# Patient Record
Sex: MALE
[De-identification: names, ages, dates, MRNs, and addresses within clinical notes are randomized per-mention and may not be internally consistent; named-entity substitution may affect disease eponyms.]

---

## 2023-09-21 ENCOUNTER — APPOINTMENT (OUTPATIENT)
Dept: UROLOGY | Facility: CLINIC | Age: 53
End: 2023-09-21
Payer: MEDICAID

## 2023-09-21 ENCOUNTER — LABORATORY RESULT (OUTPATIENT)
Age: 53
End: 2023-09-21

## 2023-09-21 VITALS
HEIGHT: 72 IN | OXYGEN SATURATION: 97 % | DIASTOLIC BLOOD PRESSURE: 129 MMHG | HEART RATE: 82 BPM | WEIGHT: 227 LBS | SYSTOLIC BLOOD PRESSURE: 174 MMHG | RESPIRATION RATE: 16 BRPM | BODY MASS INDEX: 30.75 KG/M2

## 2023-09-21 DIAGNOSIS — Z87.891 PERSONAL HISTORY OF NICOTINE DEPENDENCE: ICD-10-CM

## 2023-09-21 DIAGNOSIS — R35.0 FREQUENCY OF MICTURITION: ICD-10-CM

## 2023-09-21 DIAGNOSIS — Z63.5 DISRUPTION OF FAMILY BY SEPARATION AND DIVORCE: ICD-10-CM

## 2023-09-21 DIAGNOSIS — R39.12 POOR URINARY STREAM: ICD-10-CM

## 2023-09-21 DIAGNOSIS — I10 ESSENTIAL (PRIMARY) HYPERTENSION: ICD-10-CM

## 2023-09-21 DIAGNOSIS — R35.1 NOCTURIA: ICD-10-CM

## 2023-09-21 DIAGNOSIS — Z78.9 OTHER SPECIFIED HEALTH STATUS: ICD-10-CM

## 2023-09-21 DIAGNOSIS — R30.0 DYSURIA: ICD-10-CM

## 2023-09-21 DIAGNOSIS — F17.200 NICOTINE DEPENDENCE, UNSPECIFIED, UNCOMPLICATED: ICD-10-CM

## 2023-09-21 PROBLEM — Z00.00 ENCOUNTER FOR PREVENTIVE HEALTH EXAMINATION: Status: ACTIVE | Noted: 2023-09-21

## 2023-09-21 PROCEDURE — 99204 OFFICE O/P NEW MOD 45 MIN: CPT | Mod: 25

## 2023-09-21 PROCEDURE — 76857 US EXAM PELVIC LIMITED: CPT

## 2023-09-21 RX ORDER — TAMSULOSIN HYDROCHLORIDE 0.4 MG/1
0.4 CAPSULE ORAL
Qty: 90 | Refills: 3 | Status: ACTIVE | COMMUNITY
Start: 2023-09-21 | End: 1900-01-01

## 2023-09-21 RX ORDER — SILDENAFIL 100 MG/1
100 TABLET, FILM COATED ORAL
Qty: 60 | Refills: 3 | Status: ACTIVE | COMMUNITY
Start: 2023-09-21 | End: 1900-01-01

## 2023-09-21 SDOH — SOCIAL STABILITY - SOCIAL INSECURITY: DISRUPTION OF FAMILY BY SEPARATION AND DIVORCE: Z63.5

## 2023-09-24 ENCOUNTER — NON-APPOINTMENT (OUTPATIENT)
Age: 53
End: 2023-09-24

## 2023-09-25 LAB
APPEARANCE: CLEAR
BACTERIA UR CULT: NORMAL
BILIRUBIN URINE: NEGATIVE
BLOOD URINE: NEGATIVE
COLOR: NORMAL
GLUCOSE QUALITATIVE U: NEGATIVE MG/DL
KETONES URINE: ABNORMAL MG/DL
LEUKOCYTE ESTERASE URINE: NEGATIVE
NITRITE URINE: NEGATIVE
PH URINE: 5.5
PROTEIN URINE: 300 MG/DL
SPECIFIC GRAVITY URINE: 1.02
UROBILINOGEN URINE: 1 MG/DL

## 2023-10-23 ENCOUNTER — APPOINTMENT (OUTPATIENT)
Dept: UROLOGY | Facility: CLINIC | Age: 53
End: 2023-10-23
Payer: MEDICAID

## 2023-10-23 ENCOUNTER — LABORATORY RESULT (OUTPATIENT)
Age: 53
End: 2023-10-23

## 2023-10-23 VITALS
DIASTOLIC BLOOD PRESSURE: 123 MMHG | OXYGEN SATURATION: 98 % | HEIGHT: 72 IN | BODY MASS INDEX: 30.75 KG/M2 | SYSTOLIC BLOOD PRESSURE: 174 MMHG | WEIGHT: 227 LBS | RESPIRATION RATE: 16 BRPM | HEART RATE: 85 BPM

## 2023-10-23 DIAGNOSIS — N52.01 ERECTILE DYSFUNCTION DUE TO ARTERIAL INSUFFICIENCY: ICD-10-CM

## 2023-10-23 PROCEDURE — 99214 OFFICE O/P EST MOD 30 MIN: CPT

## 2023-10-23 RX ORDER — FINASTERIDE 5 MG/1
5 TABLET, FILM COATED ORAL DAILY
Qty: 90 | Refills: 3 | Status: ACTIVE | COMMUNITY
Start: 2023-10-23 | End: 1900-01-01

## 2023-10-24 ENCOUNTER — NON-APPOINTMENT (OUTPATIENT)
Age: 53
End: 2023-10-24

## 2023-10-24 LAB
APPEARANCE: CLEAR
BILIRUBIN URINE: ABNORMAL
BLOOD URINE: NEGATIVE
COLOR: NORMAL
GLUCOSE QUALITATIVE U: NEGATIVE MG/DL
KETONES URINE: ABNORMAL MG/DL
LEUKOCYTE ESTERASE URINE: ABNORMAL
NITRITE URINE: NEGATIVE
PH URINE: 5.5
PROTEIN URINE: 300 MG/DL
SPECIFIC GRAVITY URINE: 1.03
UROBILINOGEN URINE: 1 MG/DL

## 2023-10-27 LAB
BACTERIA UR CULT: NORMAL
PSA FREE FLD-MCNC: 14 %
PSA FREE SERPL-MCNC: 0.91 NG/ML
PSA SERPL-MCNC: 6.7 NG/ML

## 2023-10-27 RX ORDER — AMOXICILLIN AND CLAVULANATE POTASSIUM 875; 125 MG/1; MG/1
875-125 TABLET, COATED ORAL TWICE DAILY
Qty: 14 | Refills: 0 | Status: ACTIVE | COMMUNITY
Start: 2023-10-27 | End: 1900-01-01

## 2023-11-06 ENCOUNTER — TRANSCRIPTION ENCOUNTER (OUTPATIENT)
Age: 53
End: 2023-11-06

## 2023-11-27 ENCOUNTER — APPOINTMENT (OUTPATIENT)
Dept: UROLOGY | Facility: CLINIC | Age: 53
End: 2023-11-27

## 2023-11-30 ENCOUNTER — NON-APPOINTMENT (OUTPATIENT)
Age: 53
End: 2023-11-30

## 2024-02-05 ENCOUNTER — APPOINTMENT (OUTPATIENT)
Dept: UROLOGY | Facility: CLINIC | Age: 54
End: 2024-02-05
Payer: MEDICAID

## 2024-02-05 VITALS
OXYGEN SATURATION: 99 % | SYSTOLIC BLOOD PRESSURE: 158 MMHG | HEART RATE: 85 BPM | DIASTOLIC BLOOD PRESSURE: 104 MMHG | RESPIRATION RATE: 16 BRPM | WEIGHT: 225 LBS | BODY MASS INDEX: 30.48 KG/M2 | HEIGHT: 72 IN

## 2024-02-05 DIAGNOSIS — N39.0 URINARY TRACT INFECTION, SITE NOT SPECIFIED: ICD-10-CM

## 2024-02-05 DIAGNOSIS — Z12.5 ENCOUNTER FOR SCREENING FOR MALIGNANT NEOPLASM OF PROSTATE: ICD-10-CM

## 2024-02-05 PROCEDURE — G2211 COMPLEX E/M VISIT ADD ON: CPT | Mod: NC,1L

## 2024-02-05 PROCEDURE — 76857 US EXAM PELVIC LIMITED: CPT

## 2024-02-05 PROCEDURE — 99214 OFFICE O/P EST MOD 30 MIN: CPT | Mod: 25

## 2024-02-05 NOTE — HISTORY OF PRESENT ILLNESS
[FreeTextEntry1] :    Recent stroke slow urination, dysuria, and erectile dysfunction saw [facility] urologist in feb 2023 -- he was given multiple medications I rx tamsulosin and finasteride - intermittent reuslts nocturia x 3 now  Feb 2024 -- U S- prostate 67g with pvr 18ml   Sept 2023 IPSS - 22 - severe - nocturia x4 -- terrible qol  dec 2023  left upper pole renal mass pephrometry score - 8ph -- 3.3cm   For ED -- takes daily cialis -- was giving poor response with ED but did not improve urination - I rx sildenafil- 100mg which no longer having good effects  was told that he has a growth in his kidney -- due for an MRI next next month Nov 2023  as far as prostate cancer screening -- he has been told that it is normal -- he will send us those results when they are available.  oct 2023 PS A-  6.7  sept 2023 Culture - Urine; no growth Urinalysis; neg blood, neg LE and nit UA - prostate 69g with pvr of 15ml

## 2024-02-05 NOTE — PLAN
[TextEntry] : pt was treated w abx last time for bacteria in urine with high psa repeat those labs today if these are improved would consider procedure for prostate patient has appt with Dr Ness at [facility] for renal tumor that has enlarged  PDE5I are no longer working

## 2024-02-12 ENCOUNTER — APPOINTMENT (OUTPATIENT)
Dept: UROLOGY | Facility: CLINIC | Age: 54
End: 2024-02-12
Payer: MEDICAID

## 2024-02-12 VITALS — TEMPERATURE: 97 F | SYSTOLIC BLOOD PRESSURE: 151 MMHG | HEART RATE: 77 BPM | DIASTOLIC BLOOD PRESSURE: 92 MMHG

## 2024-02-12 DIAGNOSIS — R97.20 ELEVATED PROSTATE, SPECIFIC ANTIGEN [PSA]: ICD-10-CM

## 2024-02-12 DIAGNOSIS — N40.1 BENIGN PROSTATIC HYPERPLASIA WITH LOWER URINARY TRACT SYMPMS: ICD-10-CM

## 2024-02-12 DIAGNOSIS — N13.8 BENIGN PROSTATIC HYPERPLASIA WITH LOWER URINARY TRACT SYMPMS: ICD-10-CM

## 2024-02-12 LAB
BACTERIA UR CULT: NORMAL
PSA FREE FLD-MCNC: 13 %
PSA FREE SERPL-MCNC: 0.53 NG/ML
PSA SERPL-MCNC: 4.05 NG/ML

## 2024-02-12 PROCEDURE — G2211 COMPLEX E/M VISIT ADD ON: CPT | Mod: NC,1L

## 2024-02-12 PROCEDURE — 99214 OFFICE O/P EST MOD 30 MIN: CPT

## 2024-02-12 NOTE — HISTORY OF PRESENT ILLNESS
[FreeTextEntry1] : Recent stroke slow urination, dysuria, and erectile dysfunction saw [facility] urologist in feb 2023 -- he was given multiple medications I rx tamsulosin and finasteride - intermittent reuslts nocturia x 3 now Feb 2024 -- U S- prostate 67g with pvr 18ml  Sept 2023 IPSS - 22 - severe - nocturia x4 -- terrible qol  dec 2023 left upper pole renal mass pephrometry score - 8ph -- 3.3cm  For ED -- takes daily cialis -- was giving poor response with ED but did not improve urination - I rx sildenafil- 100mg which no longer having good effects  was told that he has a growth in his kidney -- due for an MRI next next month Nov 2023  as far as prostate cancer screening -- he has been told that it is normal -- he will send us those results when they are available.  oct 2023 PS A- 6.7  sept 2023 Culture - Urine; no growth Urinalysis; neg blood, neg LE and nit US- prostate 69g with pvr of 15ml  feb 2024 PSA Profile - Total - 4.05 Culture - Urine; neg

## 2024-02-12 NOTE — ASSESSMENT
[FreeTextEntry1] : psa is improved however still high for his age will order an MRI and have patient return for the results with Dr Doss so that in case there is a need for biopsy, there is the need for one fewer appt.

## 2024-02-13 LAB
CREAT SERPL-MCNC: 1.57 MG/DL
EGFR: 53 ML/MIN/1.73M2

## 2024-02-19 ENCOUNTER — RESULT REVIEW (OUTPATIENT)
Age: 54
End: 2024-02-19

## 2024-02-19 ENCOUNTER — APPOINTMENT (OUTPATIENT)
Dept: MRI IMAGING | Facility: CLINIC | Age: 54
End: 2024-02-19
Payer: MEDICAID

## 2024-02-19 ENCOUNTER — OUTPATIENT (OUTPATIENT)
Dept: OUTPATIENT SERVICES | Facility: HOSPITAL | Age: 54
LOS: 1 days | End: 2024-02-19

## 2024-02-19 PROCEDURE — 76498P: CUSTOM | Mod: 26

## 2024-02-19 PROCEDURE — 72197 MRI PELVIS W/O & W/DYE: CPT | Mod: 26

## 2024-03-12 ENCOUNTER — APPOINTMENT (OUTPATIENT)
Dept: UROLOGY | Facility: CLINIC | Age: 54
End: 2024-03-12
Payer: MEDICAID

## 2024-03-12 VITALS
WEIGHT: 225 LBS | HEART RATE: 89 BPM | DIASTOLIC BLOOD PRESSURE: 99 MMHG | BODY MASS INDEX: 30.48 KG/M2 | HEIGHT: 72 IN | RESPIRATION RATE: 16 BRPM | OXYGEN SATURATION: 98 % | SYSTOLIC BLOOD PRESSURE: 163 MMHG

## 2024-03-12 PROCEDURE — 51798 US URINE CAPACITY MEASURE: CPT

## 2024-03-12 PROCEDURE — 99205 OFFICE O/P NEW HI 60 MIN: CPT | Mod: 25

## 2024-03-13 LAB
APPEARANCE: CLEAR
BACTERIA: NEGATIVE /HPF
BILIRUBIN URINE: NEGATIVE
BLOOD URINE: NEGATIVE
CAST: 1 /LPF
COLOR: YELLOW
EPITHELIAL CELLS: 1 /HPF
GLUCOSE QUALITATIVE U: NEGATIVE MG/DL
KETONES URINE: ABNORMAL MG/DL
LEUKOCYTE ESTERASE URINE: ABNORMAL
MICROSCOPIC-UA: NORMAL
NITRITE URINE: NEGATIVE
PH URINE: 5.5
PROTEIN URINE: 100 MG/DL
RED BLOOD CELLS URINE: 2 /HPF
SPECIFIC GRAVITY URINE: 1.02
UROBILINOGEN URINE: 0.2 MG/DL
WHITE BLOOD CELLS URINE: 4 /HPF

## 2024-03-13 NOTE — LETTER BODY
[Dear  ___] : Dear  [unfilled], [Courtesy Letter:] : I had the pleasure of seeing your patient, [unfilled], in my office today. [Please see my note below.] : Please see my note below. [Consult Closing:] : Thank you very much for allowing me to participate in the care of this patient.  If you have any questions, please do not hesitate to contact me. [Sincerely,] : Sincerely, [FreeTextEntry3] : Delicia Doss MD Director of Robotic Education [facility]   [email or web address] [phone]

## 2024-03-13 NOTE — ASSESSMENT
[FreeTextEntry1] : 54 year old with multipole comorbiditiers and 3 prior strokes on AC with elevated PSA, LUTS/BPH, renal lesion, and recent episode heamturia.  Hematuria: We discussed possible etiologies of hematuria including benign (nephrolithiasis, BPH, medical renal disease) and malignant (urothelial cell carcinoma or kidneys, bladder, ureter, renal mass). We discussed risk factors and risk stratification of hematuria. Will work-up with CTU, cysto, cytology.  LUTS: primary issue at this time nocturia, pleased with daytime - SANTANA testing - avoidance fluids before bed - will reeval after CPAP testing and CPAP initiation  Elevated PSA: Today we discussed the natural history of prostate cancer, and the heterogeneous biology of this malignancy.  He understands that many men with prostate cancer will die with the disease rather than of it. We discussed the fact that many prostate cancers are slow growing and unlikely to metastasize or cause death, whereas others can be life threatening. We discussed that PSA in and of itself does not diagnose prostate cancer but only assesses risk. Prior PSAs and MRIs reviewed - prior PSA baseline (lower now and patient as patient on Fiansteride but rechecked at 3 month seema so difficult to determine exact correction) and 2 prior MRIs without lesions. PSAD <0.15 even with correction (and was on Finasteride for 3 months at that time - if use pre Finasteride value PSAD -= 0.12). Had conversation re further work-up ow with 1) biopsy 2) select MDX or 3) observation. SDM - in light of stability and reassuring MRI will monitor for now, especially in light of medical comorbidities and while working up concomitant issues. Likely PSA 6 months.  Renal lesion: We reviewed the possible underlying histology of solid enhancing renal masses, with the majority being malignant (~80%) whereas ~20% are benign (e.g. oncocytoma).  We discussed the role/possibility of percutaneous biopsy, with the associated risks, benefits, complications, and accuracy issues (e.g. risk of false negative results). The heterogeneous natural history/biology of renal cell carcinoma was discussed, including the fact that while many renal cancers are indolent, others behave aggressively. Options were reviewed including, not limited to, active surveillance (AS), surgical extirpation and ablation.  The risks of tumor growth and metastasis on active surveillance were reviewed, including the fact that metastatic progression on AS could mean missing the opportunity for cure.  The average growth rate of ~2-3 mm/year and metastasis rates of ~2-3% on AS over 5 year interval for small renal masses <4 cm was discussed. With respect to treatment we reviewed ablation (cryosurgery, radiofrequency ablation), risks of recurrence, opportunities for salvage treatment, and imaging requirements for follow up.  Oncologic outcomes for ablation were reviewed. With respect to surgery we reviewed nephron sparing surgery vs. radical nephrectomy, as well as open vs. minimally invasive surgical (MIS) approaches (e.g. laparoscopy and robotic assisted laparoscopic surgery).  Personal and institutional experience, as well as other published literature was reviewed.  Oncologic outcomes, renal functional outcomes were compared and contrasted between radical vs. NSS and open vs. MIS surgery.  Risks of acute kidney injury, medical/surgical chronic kidney disease (either exacerbation or new-onset), as well as risks of ESRD development were reviewed.   Risks of conversion from MIS to open surgery discussed.  Risks of converting from attempted partial nephrectomy to radical nephrectomy were discussed.  Risks of surgical complications were reviewed, including not limited to: bleeding//life-threatening hemorrhage, vascular/bowel/adjacent visceral organ injury, trocar/access injury, the possibility of recognized vs. unrecognized/delayed-recognition injury, risks of thermal/blunt/sharp/retraction injury, risks of DVT, PE, MI, death, risks of cardiopulmonary/anesthesia related complications, positional injury, infection/collection/abscess, wound complications/dehiscence/seroma/cellulitis, urinoma/fistula, ureteral injury/obstruction, as well as other complications.  Patient's tumor has demonstrated growth. Last imaging was 4 months ago. Obtaining CT for hematuria and will evaluate size and growth. We discussed in context of medical issues - will likely plan for biopsy, considering IR intervention - will reeval after CTU  ED: will discuss with cards if Cialis ok - patient reports has worked for him in the past. The most common adverse reactions (>2%) of Cialis include headache, dyspepsia, back pain, myalgia, nasal congestion, flushing and pain in limb. There is also a risk of prolonged erection, particularly those at increased risk for priapism. He was informed that this medication could potentiate the effects of high blood pressure medications. He should stop the medication and contact medical help if any issues or concerns with treatment. He denies prior history of cardiac disease, chest pain, or use of nitrates.  Understands cannot use nitrates if develops chest pain and must inform EMT/medical team.  Plan: - UA, culture, cytology - CTU for hematuria and reassess renal lesion growth - cystoscopy - monitoring PSA for now - reached out to cardiologist re Cialis - will ensure no contraindication from cardiac perspective given comorbidities - SANTANA eval underway - will reeval LUTS after - continue Flomax and Finasteride for now - fu after iamging for cystoscopy and discussion next steps

## 2024-03-13 NOTE — HISTORY OF PRESENT ILLNESS
[FreeTextEntry1] : Name FLACA MCKEE  MRN [id number]   1970  Contact Number: [phone] ----------------------------------------------------------------------------------------------------------------------------------------- Date of First Visit: 3/12/24 Referring Provider/PCP: Dr. Td Skelton f: [phone], Dr. Kevin Blanton (cardiologist) -----------------------------------------------------------------------------------------------------------------------------------------  CC: elevated PSA, LUTS, renal lesion, recent hematuria  History of Present Illness: FLACA MCKEE is a 54 year old male who presents for elevated PSA and LUTS. Patient of Dr. Hunter. On Flomax and Finasteride since end of October. PSA at that time was 6.7 with questionable positive culture, treated and repeat PSA 2023 4.05 (on 3 months of Finasteride). On prior TRUS prostate 67g with pvr 18ml. Subsequently underwent prostate MRI 24: Size: 5.0 x 3.8 x 5.3 [transverse x AP x CC] cm. Volume: 52.6 cc, Central gland: Moderate BPH. No MRI targetable lesion. 1.2 cm of protrusion of central gland BPH nodule into base of urinary bladder. Peripheral zone: No MRI targetable lesion. Patchy mild T2 hypointensities bilateral peripheral zone without restricted diffusion but with some enhancement post gadolinium which is suspicious for inflammation/prostatitis. MRI Targets: None  Patient then had a stroke in November - has had 3 strokes in the past. On AC.  LUTS: Patient reports has had issues with urination since  . No significant issues with urination in the daytime. No daytime frequency or urgency. Does report nocturia 3-5x - that is most bothersome. Sleep apnea suspected and patient has a sleep study in April. Stream is good overall. Unsure if emptying bladder completely. Continues on Flomax and Fiansteride and feels has been helping urination. Daytime issues are not bothersome - it is primarily a nighttime issue.  Hematuria: patient reports had an episode of hematuria - last week.  A few drops. Left a urine sample, no other changes, and cleared up on its own. Culture was negative.  Had this a few years ago and not worked up. Reports ex-smoker - quit over 20 years ago, smoked a pack a week for a few years at most. No history nephrolithiasis. No history symptomatic UTIs. No occupational exposures.  Elevated PSA: History of elevated PSA - see trend below. Had prior MRI - PIRADS 1. Repeat MRI here - PIRADS 2, 52.6cc, PSAD 0.07 (though has been on Finasteride 3 months).  Renal lesion: Patient had renal lesion seen on CT 2023.  2.6 x 2.4 x 1.9 cm. Plan was for observation, had repeat CT 2023:  Increase size of heterogeneously enhancing left upper pole mass, now measures 3.3 x 3 x 1.8 cm.  ED: previously tried daily Cialis and reports worked well. Dr. Hunter tried Sildenafil and did not feel had effect. Interested in Cialis prn.  IPSS 12 QOL 5 ROSARIO 12 PVR (to ensure adequate emptying): 0  PSA History: 21: 7.3 21: 6.17 3/28/22: 5.29 10/6/22: 7.76 23: 5.88 10/23/23: 6.7 24: 4.05  PMH: afib, CAD, stroke x 3 (, , and 2023)- no residual deficits, HTN PSH: loop recorder Family History: no  malignancies - no prostate or kidney or bladder cancer, no breast or ovarian cancer Social: , 6 children, on disability, ex-smoker (quit over 20 years ago, smoked a pack a week for a few years at most), +alcohol 6 pack daily + whiskey intermittently, rare marijuana Meds: nifedipine, eliquis, entreso, hydralazine, labetalol, spironolactone, thiamine, finasteride, atorvastatin, tamsulosin Allergies: NKDA, eggplant, strawberries, broccoli ROS: no fevers, chills, dysuria ----------------------------------------------------------------------------------------------------------------------------------------- Labs: PSA History: 21: 7.3 21: 6.17 3/28/22: 5.29 10/6/22: 7.76 23: 5.88 10/23/23: 6.7 24: 4.05  Imagin23: MRI RENAL WITH AND WITHOUT IV CONTRAST   FINDINGS: 01. LIVER: Normal morphology.  Hepatic steatosis.  No suspicious lesion. Scattered cysts. 02. SPLEEN: Normal. 03. PANCREAS: Pancreatic cysts measuring up to 0.5 cm (series 3 image 17). No suspicious solid mass, ductal dilation, or peripancreatic abnormality. 04. GALLBLADDER/BILIARY TREE: No biliary duct dilatation.  Normal gallbladder.    05. ADRENALS: Normal. 06. KIDNEYS: Symmetric enhancement. No hydronephrosis. Scattered bilateral simple and proteinaceous/hemorrhagic cysts. Increase size of heterogeneously enhancing left upper pole mass, now measures 3.3 x 3 x 1.8 cm (series 7 image 40, series 3 image 30), previously 2.6 x 2.4 x 1.9 cm when remeasured similarly.   Nephrometry Score: Radius (maximal diameter in cm): < or = 4 cm (1 point) Exophytic/Endophytic properties: <50% (2 points) Nearness of the tumor to the collecting system (mm): < or = 4 mm (3 points) Anterior/Posterior: Posterior (p) Location relative to polar lines: Lesion crosses the polar line (2 points) Tumor touches renal artery or vein yes = h Nephrometry score: 8ph 07. LYMPHADENOPATHY/RETROPERITONEUM: No lymphadenopathy. 08. BOWEL: No bowel obstruction.  09. PERITONEUM/ABDOMINAL WALL:  No ascites.    10. VASCULATURE: Normal caliber abdominal aorta. 11. SKELETAL: No aggressive lesions.  12. LUNG BASES: No nodule or pleural effusion.  IMPRESSION: Increased size of left upper pole renal mass, concerning for renal cell carcinoma. (Nephrometry score: 8ph).  Hepatic steatosis. Pancreatic cysts measuring up to 0.5 cm. No suspicious solid mass, ductal dilation, or peripancreatic abnormality.  According to the  [facility] Consensus Guidelines for Pancreatic Cysts, for patients without risk factors or history of pancreatic disease, consultation with a Gastroenterologist OR repeat MRI with testing for Ca19-9 and Hemoglobin A1C is recommended in 12 months.  Patients with personal history or risk factors for pancreatic disease are recommended to be evaluated by a Multidisciplinary Pancreas Clinic.   MRI 24:  Size: 5.0 x 3.8 x 5.3 [transverse x AP x CC] cm. Volume: 52.6 cc, Central gland: Moderate BPH. No MRI targetable lesion. 1.2 cm of protrusion of central gland BPH nodule into base of urinary bladder. Peripheral zone: No MRI targetable lesion. Patchy mild T2 hypointensities bilateral peripheral zone without restricted diffusion but with some enhancement post gadolinium which is suspicious for inflammation/prostatitis. MRI Targets: None Neurovascular bundle: No evidence of neurovascular bundle invasion. Seminal vesicles: No seminal vesicle invasion. Lymph nodes: No pelvic adenopathy. Bones: No suspicious lesions identified. Urinary bladder: Mildly trabeculated. Mild circumferential bladder urothelial enhancement. Other: Scattered colonic diverticula. Normal appendix.  IMPRESSION:  PIRADS 2 - Low (clinically significant cancer is unlikely to be present) BPH.

## 2024-03-14 ENCOUNTER — NON-APPOINTMENT (OUTPATIENT)
Age: 54
End: 2024-03-14

## 2024-03-14 LAB — BACTERIA UR CULT: NORMAL

## 2024-03-18 LAB — URINE CYTOLOGY: NORMAL

## 2024-03-20 ENCOUNTER — APPOINTMENT (OUTPATIENT)
Dept: CT IMAGING | Facility: CLINIC | Age: 54
End: 2024-03-20
Payer: MEDICAID

## 2024-03-20 ENCOUNTER — OUTPATIENT (OUTPATIENT)
Dept: OUTPATIENT SERVICES | Facility: HOSPITAL | Age: 54
LOS: 1 days | End: 2024-03-20

## 2024-03-20 PROCEDURE — 74178 CT ABD&PLV WO CNTR FLWD CNTR: CPT | Mod: 26

## 2024-04-17 ENCOUNTER — APPOINTMENT (OUTPATIENT)
Dept: UROLOGY | Facility: CLINIC | Age: 54
End: 2024-04-17
Payer: MEDICAID

## 2024-04-17 VITALS
HEART RATE: 88 BPM | OXYGEN SATURATION: 97 % | SYSTOLIC BLOOD PRESSURE: 148 MMHG | WEIGHT: 225 LBS | DIASTOLIC BLOOD PRESSURE: 90 MMHG | HEIGHT: 72 IN | BODY MASS INDEX: 30.48 KG/M2 | RESPIRATION RATE: 16 BRPM

## 2024-04-17 DIAGNOSIS — R31.0 GROSS HEMATURIA: ICD-10-CM

## 2024-04-17 PROCEDURE — 99214 OFFICE O/P EST MOD 30 MIN: CPT

## 2024-04-18 LAB
APPEARANCE: CLEAR
BACTERIA: NEGATIVE /HPF
BILIRUBIN URINE: NEGATIVE
BLOOD URINE: ABNORMAL
CAST: 0 /LPF
COLOR: YELLOW
EPITHELIAL CELLS: 1 /HPF
GLUCOSE QUALITATIVE U: NEGATIVE MG/DL
KETONES URINE: NEGATIVE MG/DL
LEUKOCYTE ESTERASE URINE: ABNORMAL
MICROSCOPIC-UA: NORMAL
NITRITE URINE: NEGATIVE
PH URINE: 6
PROTEIN URINE: 100 MG/DL
RED BLOOD CELLS URINE: 287 /HPF
SPECIFIC GRAVITY URINE: 1.02
URINE CYTOLOGY: NORMAL
UROBILINOGEN URINE: 1 MG/DL
WHITE BLOOD CELLS URINE: 17 /HPF

## 2024-04-18 NOTE — ASSESSMENT
[FreeTextEntry1] : 54 year old with multipole comorbiditiers and 3 prior strokes on AC with elevated PSA, LUTS/BPH, renal lesion, and recent episode heamturia.  Hematuria: We discussed possible etiologies of hematuria including benign (nephrolithiasis, BPH, medical renal disease) and malignant (urothelial cell carcinoma or kidneys, bladder, ureter, renal mass). We discussed risk factors and risk stratification of hematuria. Will work-up with CTU, cysto, cytology.  LUTS: primary issue at this time nocturia, pleased with daytime - SANTANA testing - avoidance fluids before bed - will reeval after CPAP testing and CPAP initiation  Elevated PSA: Today we discussed the natural history of prostate cancer, and the heterogeneous biology of this malignancy. He understands that many men with prostate cancer will die with the disease rather than of it. We discussed the fact that many prostate cancers are slow growing and unlikely to metastasize or cause death, whereas others can be life threatening. We discussed that PSA in and of itself does not diagnose prostate cancer but only assesses risk. Prior PSAs and MRIs reviewed - prior PSA baseline (lower now and patient as patient on Fiansteride but rechecked at 3 month seema so difficult to determine exact correction) and 2 prior MRIs without lesions. PSAD <0.15 even with correction (and was on Finasteride for 3 months at that time - if use pre Finasteride value PSAD -= 0.12). Had conversation re further work-up ow with 1) biopsy 2) select MDX or 3) observation. SDM - in light of stability and reassuring MRI will monitor for now, especially in light of medical comorbidities and while working up concomitant issues. Likely PSA 6 months.  Renal lesion: We reviewed the possible underlying histology of solid enhancing renal masses, with the majority being malignant (~80%) whereas ~20% are benign (e.g. oncocytoma). We discussed the role/possibility of percutaneous biopsy, with the associated risks, benefits, complications, and accuracy issues (e.g. risk of false negative results). The heterogeneous natural history/biology of renal cell carcinoma was discussed, including the fact that while many renal cancers are indolent, others behave aggressively. Options were reviewed including, not limited to, active surveillance (AS), surgical extirpation and ablation. The risks of tumor growth and metastasis on active surveillance were reviewed, including the fact that metastatic progression on AS could mean missing the opportunity for cure. The average growth rate of ~2-3 mm/year and metastasis rates of ~2-3% on AS over 5 year interval for small renal masses <4 cm was discussed. With respect to treatment we reviewed ablation (cryosurgery, radiofrequency ablation), risks of recurrence, opportunities for salvage treatment, and imaging requirements for follow up. Oncologic outcomes for ablation were reviewed. With respect to surgery we reviewed nephron sparing surgery vs. radical nephrectomy, as well as open vs. minimally invasive surgical (MIS) approaches (e.g. laparoscopy and robotic assisted laparoscopic surgery). Personal and institutional experience, as well as other published literature was reviewed. Oncologic outcomes, renal functional outcomes were compared and contrasted between radical vs. NSS and open vs. MIS surgery. Risks of acute kidney injury, medical/surgical chronic kidney disease (either exacerbation or new-onset), as well as risks of ESRD development were reviewed. Risks of conversion from MIS to open surgery discussed. Risks of converting from attempted partial nephrectomy to radical nephrectomy were discussed. Risks of surgical complications were reviewed, including not limited to: bleeding//life-threatening hemorrhage, vascular/bowel/adjacent visceral organ injury, trocar/access injury, the possibility of recognized vs. unrecognized/delayed-recognition injury, risks of thermal/blunt/sharp/retraction injury, risks of DVT, PE, MI, death, risks of cardiopulmonary/anesthesia related complications, positional injury, infection/collection/abscess, wound complications/dehiscence/seroma/cellulitis, urinoma/fistula, ureteral injury/obstruction, as well as other complications.  Patient's tumor has demonstrated growth. Last imaging was 4 months ago. Obtaining CT for hematuria and will evaluate size and growth. We discussed in context of medical issues - will likely plan for biopsy, considering IR intervention - will reeval after CTU  ED: will discuss with cards if Cialis ok - patient reports has worked for him in the past. The most common adverse reactions (>2%) of Cialis include headache, dyspepsia, back pain, myalgia, nasal congestion, flushing and pain in limb. There is also a risk of prolonged erection, particularly those at increased risk for priapism. He was informed that this medication could potentiate the effects of high blood pressure medications. He should stop the medication and contact medical help if any issues or concerns with treatment. He denies prior history of cardiac disease, chest pain, or use of nitrates. Understands cannot use nitrates if develops chest pain and must inform EMT/medical team.  Plan: - UA, culture, cytology - CTU for hematuria and reassess renal lesion growth - cystoscopy - monitoring PSA for now - reached out to cardiologist re Cialis - will ensure no contraindication from cardiac perspective given comorbidities - SANTANA eval underway - will reeval LUTS after - continue Flomax and Finasteride for now - fu after iamging for cystoscopy and discussion next steps.    PLAN: - cysto with niotrous

## 2024-04-18 NOTE — HISTORY OF PRESENT ILLNESS
[FreeTextEntry1] : Name FLACA MCKEE MRN [id number]  1970 Contact Number: [phone] ----------------------------------------------------------------------------------------------------------------------------------------- Date of First Visit: 3/12/24 Referring Provider/PCP: Dr. Td Skelton f: [phone], Dr. Kevin Blanton (cardiologist) -----------------------------------------------------------------------------------------------------------------------------------------  CC: elevated PSA, LUTS, renal lesion, recent hematuria  History of Present Illness: FLACA MCKEE is a 54 year old male who presents for elevated PSA and LUTS. Patient of Dr. Hunter. On Flomax and Finasteride since end of October. PSA at that time was 6.7 with questionable positive culture, treated and repeat PSA 2023 4.05 (on 3 months of Finasteride). On prior TRUS prostate 67g with pvr 18ml. Subsequently underwent prostate MRI 24: Size: 5.0 x 3.8 x 5.3 [transverse x AP x CC] cm. Volume: 52.6 cc, Central gland: Moderate BPH. No MRI targetable lesion. 1.2 cm of protrusion of central gland BPH nodule into base of urinary bladder. Peripheral zone: No MRI targetable lesion. Patchy mild T2 hypointensities bilateral peripheral zone without restricted diffusion but with some enhancement post gadolinium which is suspicious for inflammation/prostatitis. MRI Targets: None  Patient then had a stroke in November - has had 3 strokes in the past. On AC.  LUTS: Patient reports has had issues with urination since 2018 . No significant issues with urination in the daytime. No daytime frequency or urgency. Does report nocturia 3-5x - that is most bothersome. Sleep apnea suspected and patient has a sleep study in April. Stream is good overall. Unsure if emptying bladder completely. Continues on Flomax and Fiansteride and feels has been helping urination. Daytime issues are not bothersome - it is primarily a nighttime issue.  Hematuria: patient reports had an episode of hematuria - last week. A few drops. Left a urine sample, no other changes, and cleared up on its own. Culture was negative. Had this a few years ago and not worked up. Reports ex-smoker - quit over 20 years ago, smoked a pack a week for a few years at most. No history nephrolithiasis. No history symptomatic UTIs. No occupational exposures.  Elevated PSA: History of elevated PSA - see trend below. Had prior MRI - PIRADS 1. Repeat MRI here - PIRADS 2, 52.6cc, PSAD 0.07 (though has been on Finasteride 3 months).  Renal lesion: Patient had renal lesion seen on CT 2023. 2.6 x 2.4 x 1.9 cm. Plan was for observation, had repeat CT 2023: Increase size of heterogeneously enhancing left upper pole mass, now measures 3.3 x 3 x 1.8 cm.  ED: previously tried daily Cialis and reports worked well. Dr. Hunter tried Sildenafil and did not feel had effect. Interested in Cialis prn.  IPSS 12 QOL 5 ROSARIO 12 PVR (to ensure adequate emptying): 0  PSA History: 21: 7.3 21: 6.17 3/28/22: 5.29 10/6/22: 7.76 23: 5.88 10/23/23: 6.7 24: 4.05 ---------------------------------------------------------------------------------------------------------------------------------------- Interval History (2024):  3/12/24 urine studies: UA: trace LE nitrite neg +protein, 4 WBC 2 RBC UCx no growth Cytology: negative for HG  CTU 3/14/24: FINDINGS: LOWER CHEST: Within normal limits. LIVER: Multiple small, centimeter or less, nonenhancing low-attenuation foci, likely small cysts. BILE DUCTS: Normal caliber. GALLBLADDER: Within normal limits. SPLEEN: Within normal limits. PANCREAS: Within normal limits. ADRENALS: Within normal limits. KIDNEYS/URETERS: Right kidney: Multiple, varied sized, subcentimeter low-attenuation lesions noted on the venous phase images (series 4) without definite enhancing lesion. No right renal collecting system obstruction. Left kidney: An enhancing, 2.3 cm left interpole mass is noted (4:40; 5:37). 2.1 cm lower pole simple cyst left kidney. 1.5 cm left upper pole simple cyst. Scattered other subcentimeter indeterminate low-attenuation foci. No left renal collecting system obstruction.  BLADDER: Mild bladder wall thickening REPRODUCTIVE ORGANS: Enlarged prostate gland  BOWEL: No bowel obstruction. PERITONEUM: No ascites. VESSELS: Scattered atherosclerotic calcifications RETROPERITONEUM/LYMPH NODES: No lymphadenopathy. ABDOMINAL WALL: Within normal limits. BONES: No suspicious osseous lesions  IMPRESSION: 1.  2.3 cm enhancing left interpole renal mass, suspicious for neoplasm. 2.  Scattered other bilateral indeterminate subcentimeter low-attenuation renal cortical lesions.  Recent and prior imaging reviewed with Dr. Roa: stable in all planes between 5/15/2023 and 3/20/2024; 2.3 cm in all planes since 2023, the reported changes in size is due to interobserver variation.   ----------------------------------------------------------------------------------------------------------------------------------------  PMH: afib, CAD, stroke x 3 (, , and 2023)- no residual deficits, HTN PSH: loop recorder Family History: no  malignancies - no prostate or kidney or bladder cancer, no breast or ovarian cancer Social: , 6 children, on disability, ex-smoker (quit over 20 years ago, smoked a pack a week for a few years at most), +alcohol 6 pack daily + whiskey intermittently, rare marijuana Meds: nifedipine, eliquis, entreso, hydralazine, labetalol, spironolactone, thiamine, finasteride, atorvastatin, tamsulosin Allergies: NKDA, eggplant, strawberries, broccoli ROS: no fevers, chills, dysuria ----------------------------------------------------------------------------------------------------------------------------------------- Labs: PSA History: 21: 7.3 21: 6.17 3/28/22: 5.29 10/6/22: 7.76 23: 5.88 10/23/23: 6.7 24: 4.05  Imagin23: MRI RENAL WITH AND WITHOUT IV CONTRAST  FINDINGS: 01. LIVER: Normal morphology. Hepatic steatosis. No suspicious lesion. Scattered cysts. 02. SPLEEN: Normal. 03. PANCREAS: Pancreatic cysts measuring up to 0.5 cm (series 3 image 17). No suspicious solid mass, ductal dilation, or peripancreatic abnormality. 04. GALLBLADDER/BILIARY TREE: No biliary duct dilatation. Normal gallbladder. 05. ADRENALS: Normal. 06. KIDNEYS: Symmetric enhancement. No hydronephrosis. Scattered bilateral simple and proteinaceous/hemorrhagic cysts. Increase size of heterogeneously enhancing left upper pole mass, now measures 3.3 x 3 x 1.8 cm (series 7 image 40, series 3 image 30), previously 2.6 x 2.4 x 1.9 cm when remeasured similarly.  Nephrometry Score: Radius (maximal diameter in cm): < or = 4 cm (1 point) Exophytic/Endophytic properties: <50% (2 points) Nearness of the tumor to the collecting system (mm): < or = 4 mm (3 points) Anterior/Posterior: Posterior (p) Location relative to polar lines: Lesion crosses the polar line (2 points) Tumor touches renal artery or vein yes = h Nephrometry score: 8ph 07. LYMPHADENOPATHY/RETROPERITONEUM: No lymphadenopathy. 08. BOWEL: No bowel obstruction. 09. PERITONEUM/ABDOMINAL WALL: No ascites. 10. VASCULATURE: Normal caliber abdominal aorta. 11. SKELETAL: No aggressive lesions. 12. LUNG BASES: No nodule or pleural effusion. IMPRESSION: Increased size of left upper pole renal mass, concerning for renal cell carcinoma. (Nephrometry score: 8ph). Hepatic steatosis. Pancreatic cysts measuring up to 0.5 cm. No suspicious solid mass, ductal dilation, or peripancreatic abnormality. According to the 202 [facility] Consensus Guidelines for Pancreatic Cysts, for patients without risk factors or history of pancreatic disease, consultation with a Gastroenterologist OR repeat MRI with testing for Ca19-9 and Hemoglobin A1C is recommended in 12 months. Patients with personal history or risk factors for pancreatic disease are recommended to be evaluated by a Multidisciplinary Pancreas Clinic.  MRI 24: Size: 5.0 x 3.8 x 5.3 [transverse x AP x CC] cm. Volume: 52.6 cc, Central gland: Moderate BPH. No MRI targetable lesion. 1.2 cm of protrusion of central gland BPH nodule into base of urinary bladder. Peripheral zone: No MRI targetable lesion. Patchy mild T2 hypointensities bilateral peripheral zone without restricted diffusion but with some enhancement post gadolinium which is suspicious for inflammation/prostatitis. MRI Targets: None Neurovascular bundle: No evidence of neurovascular bundle invasion. Seminal vesicles: No seminal vesicle invasion. Lymph nodes: No pelvic adenopathy. Bones: No suspicious lesions identified. Urinary bladder: Mildly trabeculated. Mild circumferential bladder urothelial enhancement. Other: Scattered colonic diverticula. Normal appendix.  IMPRESSION:  PIRADS 2 - Low (clinically significant cancer is unlikely to be present) BPH.

## 2024-04-19 LAB — BACTERIA UR CULT: NORMAL

## 2024-05-21 ENCOUNTER — APPOINTMENT (OUTPATIENT)
Dept: UROLOGY | Facility: CLINIC | Age: 54
End: 2024-05-21

## 2024-05-21 ENCOUNTER — NON-APPOINTMENT (OUTPATIENT)
Age: 54
End: 2024-05-21

## 2024-05-31 ENCOUNTER — APPOINTMENT (OUTPATIENT)
Dept: UROLOGY | Facility: CLINIC | Age: 54
End: 2024-05-31

## 2024-06-05 ENCOUNTER — NON-APPOINTMENT (OUTPATIENT)
Age: 54
End: 2024-06-05

## 2024-07-01 ENCOUNTER — NON-APPOINTMENT (OUTPATIENT)
Age: 54
End: 2024-07-01

## 2024-07-01 NOTE — LETTER BODY
[Dear  ___] : Dear  [unfilled], [Courtesy Letter:] : I had the pleasure of seeing your patient, [unfilled], in my office today. [Please see my note below.] : Please see my note below. [Consult Closing:] : Thank you very much for allowing me to participate in the care of this patient.  If you have any questions, please do not hesitate to contact me. [Sincerely,] : Sincerely, [FreeTextEntry3] : Delicia Doss MD Director of Robotic Education [facility]   [email or web address] [phone]

## 2024-07-01 NOTE — ASSESSMENT
[FreeTextEntry1] : 54 year old with multipole comorbiditiers and 3 prior strokes on AC with elevated PSA, LUTS/BPH, renal lesion, and recent episode heamturia.  Hematuria: We discussed possible etiologies of hematuria including benign (nephrolithiasis, BPH, medical renal disease) and malignant (urothelial cell carcinoma or kidneys, bladder, ureter, renal mass). We discussed risk factors and risk stratification of hematuria. No etiology on CTU - + enlarged prostate, mild bladder wall thickening. Cytology negative. Discussed importance of cystoscopy - patient would like to bring his father and do under nitrous - understands necessary and will schedule in coming weeks.  LUTS: primary issue at this time nocturia, pleased with daytime - SANTANA testing - patient reports this is scheduled in coming weeks - avoidance fluids before bed - will reeval after SANTANA testing and CPAP initiation  Elevated PSA: Today we discussed the natural history of prostate cancer, and the heterogeneous biology of this malignancy. He understands that many men with prostate cancer will die with the disease rather than of it. We discussed the fact that many prostate cancers are slow growing and unlikely to metastasize or cause death, whereas others can be life threatening. We discussed that PSA in and of itself does not diagnose prostate cancer but only assesses risk. Prior PSAs and MRIs reviewed - prior PSA baseline (lower now and patient as patient on Fiansteride but rechecked at 3 month seema so difficult to determine exact correction) and 2 prior MRIs without lesions. PSAD <0.15 even with correction (and was on Finasteride for 3 months at that time - if use pre Finasteride value PSAD -= 0.12). Had conversation re further work-up with 1) biopsy 2) select MDX or 3) observation. SDM - in light of stability and reassuring MRI will monitor for now, especially in light of medical comorbidities and while working up concomitant issues. PSA 6 months.  Renal lesion: We again reviewed the possible underlying histology of solid enhancing renal masses, with the majority being malignant (~80%) whereas ~20% are benign (e.g. oncocytoma). We discussed the role/possibility of percutaneous biopsy, with the associated risks, benefits, complications, and accuracy issues (e.g. risk of false negative results). The heterogeneous natural history/biology of renal cell carcinoma was discussed, including the fact that while many renal cancers are indolent, others behave aggressively. Options were reviewed including, not limited to, active surveillance (AS), surgical extirpation and ablation. The risks of tumor growth and metastasis on active surveillance were reviewed, including the fact that metastatic progression on AS could mean missing the opportunity for cure. The average growth rate of ~2-3 mm/year and metastasis rates of ~2-3% on AS over 5 year interval for small renal masses <4 cm was discussed. With respect to treatment we reviewed ablation (cryosurgery, radiofrequency ablation), risks of recurrence, opportunities for salvage treatment, and imaging requirements for follow up. Oncologic outcomes for ablation were reviewed. With respect to surgery we reviewed nephron sparing surgery vs. radical nephrectomy, as well as open vs. minimally invasive surgical (MIS) approaches (e.g. laparoscopy and robotic assisted laparoscopic surgery). Personal and institutional experience, as well as other published literature was reviewed. Oncologic outcomes, renal functional outcomes were compared and contrasted between radical vs. NSS and open vs. MIS surgery. Risks of acute kidney injury, medical/surgical chronic kidney disease (either exacerbation or new-onset), as well as risks of ESRD development were reviewed. Risks of conversion from MIS to open surgery discussed. Risks of converting from attempted partial nephrectomy to radical nephrectomy were discussed. Risks of surgical complications were reviewed, including not limited to: bleeding//life-threatening hemorrhage, vascular/bowel/adjacent visceral organ injury, trocar/access injury, the possibility of recognized vs. unrecognized/delayed-recognition injury, risks of thermal/blunt/sharp/retraction injury, risks of DVT, PE, MI, death, risks of cardiopulmonary/anesthesia related complications, positional injury, infection/collection/abscess, wound complications/dehiscence/seroma/cellulitis, urinoma/fistula, ureteral injury/obstruction, as well as other complications.  CTU showed 2.3cm and reviewed with radiology has been stable since 2023. Patient would like to continue to monitor at this time given stability and comorbidities. Repeat imaging 6 months.  ED: will discuss with cards if Cialis ok - patient reports has worked for him in the past. The most common adverse reactions (>2%) of Cialis include headache, dyspepsia, back pain, myalgia, nasal congestion, flushing and pain in limb. There is also a risk of prolonged erection, particularly those at increased risk for priapism. He was informed that this medication could potentiate the effects of high blood pressure medications. He should stop the medication and contact medical help if any issues or concerns with treatment. He denies prior history of cardiac disease, chest pain, or use of nitrates. Understands cannot use nitrates if develops chest pain and must inform EMT/medical team. Has appt next week.  Plan: - cystoscopy - monitoring PSA for now - repeat 8/2024 - reached out to cardiologist re Cialis - will ensure no contraindication from cardiac perspective given comorbidities  - SANTANA eval underway - will reeval LUTS after - continue Flomax and Finasteride for now - fu for cysto with nitrous. - CT 9/2024

## 2024-07-01 NOTE — HISTORY OF PRESENT ILLNESS
[FreeTextEntry1] : Name FLACA MCKEE MRN [id number]  1970 Contact Number: [phone] ----------------------------------------------------------------------------------------------------------------------------------------- Date of First Visit: 3/12/24 Referring Provider/PCP: Dr. Td Skelton f: [phone], Dr. Kevin Blanton (cardiologist) -----------------------------------------------------------------------------------------------------------------------------------------  CC: elevated PSA, LUTS, renal lesion, recent hematuria  History of Present Illness: FLACA MCKEE is a 54 year old male who presents for elevated PSA and LUTS. Patient of Dr. Hunter. On Flomax and Finasteride since end of October. PSA at that time was 6.7 with questionable positive culture, treated and repeat PSA 2023 4.05 (on 3 months of Finasteride). On prior TRUS prostate 67g with pvr 18ml. Subsequently underwent prostate MRI 24: Size: 5.0 x 3.8 x 5.3 [transverse x AP x CC] cm. Volume: 52.6 cc, Central gland: Moderate BPH. No MRI targetable lesion. 1.2 cm of protrusion of central gland BPH nodule into base of urinary bladder. Peripheral zone: No MRI targetable lesion. Patchy mild T2 hypointensities bilateral peripheral zone without restricted diffusion but with some enhancement post gadolinium which is suspicious for inflammation/prostatitis. MRI Targets: None  Patient then had a stroke in November - has had 3 strokes in the past. On AC.  LUTS: Patient reports has had issues with urination since 2018 . No significant issues with urination in the daytime. No daytime frequency or urgency. Does report nocturia 3-5x - that is most bothersome. Sleep apnea suspected and patient has a sleep study in April. Stream is good overall. Unsure if emptying bladder completely. Continues on Flomax and Fiansteride and feels has been helping urination. Daytime issues are not bothersome - it is primarily a nighttime issue.  Hematuria: patient reports had an episode of hematuria - last week. A few drops. Left a urine sample, no other changes, and cleared up on its own. Culture was negative. Had this a few years ago and not worked up. Reports ex-smoker - quit over 20 years ago, smoked a pack a week for a few years at most. No history nephrolithiasis. No history symptomatic UTIs. No occupational exposures.  Elevated PSA: History of elevated PSA - see trend below. Had prior MRI - PIRADS 1. Repeat MRI here - PIRADS 2, 52.6cc, PSAD 0.07 (though has been on Finasteride 3 months).  Renal lesion: Patient had renal lesion seen on CT 2023. 2.6 x 2.4 x 1.9 cm. Plan was for observation, had repeat CT 2023: Increase size of heterogeneously enhancing left upper pole mass, now measures 3.3 x 3 x 1.8 cm.  ED: previously tried daily Cialis and reports worked well. Dr. Hunter tried Sildenafil and did not feel had effect. Interested in Cialis prn.  IPSS 12 QOL 5 ROSARIO 12 PVR (to ensure adequate emptying): 0  PSA History: 21: 7.3 21: 6.17 3/28/22: 5.29 10/6/22: 7.76 23: 5.88 10/23/23: 6.7 24: 4.05 ---------------------------------------------------------------------------------------------------------------------------------------- Interval History (2024):  3/12/24 urine studies: UA: trace LE nitrite neg +protein, 4 WBC 2 RBC UCx no growth Cytology: negative for HG  CTU 3/14/24: FINDINGS: LOWER CHEST: Within normal limits. LIVER: Multiple small, centimeter or less, nonenhancing low-attenuation foci, likely small cysts. BILE DUCTS: Normal caliber. GALLBLADDER: Within normal limits. SPLEEN: Within normal limits. PANCREAS: Within normal limits. ADRENALS: Within normal limits. KIDNEYS/URETERS: Right kidney: Multiple, varied sized, subcentimeter low-attenuation lesions noted on the venous phase images (series 4) without definite enhancing lesion. No right renal collecting system obstruction. Left kidney: An enhancing, 2.3 cm left interpole mass is noted (4:40; 5:37). 2.1 cm lower pole simple cyst left kidney. 1.5 cm left upper pole simple cyst. Scattered other subcentimeter indeterminate low-attenuation foci. No left renal collecting system obstruction. BLADDER: Mild bladder wall thickening REPRODUCTIVE ORGANS: Enlarged prostate gland BOWEL: No bowel obstruction. PERITONEUM: No ascites. VESSELS: Scattered atherosclerotic calcifications RETROPERITONEUM/LYMPH NODES: No lymphadenopathy. ABDOMINAL WALL: Within normal limits. BONES: No suspicious osseous lesions  IMPRESSION: 1. 2.3 cm enhancing left interpole renal mass, suspicious for neoplasm. 2. Scattered other bilateral indeterminate subcentimeter low-attenuation renal cortical lesions.  Recent and prior imaging reviewed with Dr. Roa: stable in all planes between 5/15/2023 and 3/20/2024; 2.3 cm in all planes since 2023, the reported changes in size is due to interobserver variation.  Plan was for cystoscopy today, but patient reports he is too nervous to proceed. Reports another episode of hematuria without any change in other symptoms, urination otherwise at baseline. ---------------------------------------------------------------------------------------------------------------------------------------- Interval History (2024):  SANTANA testing:  Cards:  Cysto:   ---------------------------------------------------------------------------------------------------------------------------------------- ---------------------------------------------------------------------------------------------------------------------------------------- PMH: afib, CAD, stroke x 3 (, , and 2023)- no residual deficits, HTN PSH: loop recorder Family History: no  malignancies - no prostate or kidney or bladder cancer, no breast or ovarian cancer Social: , 6 children, on disability, ex-smoker (quit over 20 years ago, smoked a pack a week for a few years at most), +alcohol 6 pack daily + whiskey intermittently, rare marijuana Meds: nifedipine, eliquis, entreso, hydralazine, labetalol, spironolactone, thiamine, finasteride, atorvastatin, tamsulosin Allergies: NKDA, eggplant, strawberries, broccoli ROS: no fevers, chills, dysuria ----------------------------------------------------------------------------------------------------------------------------------------- Labs: PSA History: 21: 7.3 21: 6.17 3/28/22: 5.29 10/6/22: 7.76 23: 5.88 10/23/23: 6.7 24: 4.05  Imagin23: MRI RENAL WITH AND WITHOUT IV CONTRAST  FINDINGS: 01. LIVER: Normal morphology. Hepatic steatosis. No suspicious lesion. Scattered cysts. 02. SPLEEN: Normal. 03. PANCREAS: Pancreatic cysts measuring up to 0.5 cm (series 3 image 17). No suspicious solid mass, ductal dilation, or peripancreatic abnormality. 04. GALLBLADDER/BILIARY TREE: No biliary duct dilatation. Normal gallbladder. 05. ADRENALS: Normal. 06. KIDNEYS: Symmetric enhancement. No hydronephrosis. Scattered bilateral simple and proteinaceous/hemorrhagic cysts. Increase size of heterogeneously enhancing left upper pole mass, now measures 3.3 x 3 x 1.8 cm (series 7 image 40, series 3 image 30), previously 2.6 x 2.4 x 1.9 cm when remeasured similarly.  Nephrometry Score: Radius (maximal diameter in cm): < or = 4 cm (1 point) Exophytic/Endophytic properties: <50% (2 points) Nearness of the tumor to the collecting system (mm): < or = 4 mm (3 points) Anterior/Posterior: Posterior (p) Location relative to polar lines: Lesion crosses the polar line (2 points) Tumor touches renal artery or vein yes = h Nephrometry score: 8ph 07. LYMPHADENOPATHY/RETROPERITONEUM: No lymphadenopathy. 08. BOWEL: No bowel obstruction. 09. PERITONEUM/ABDOMINAL WALL: No ascites. 10. VASCULATURE: Normal caliber abdominal aorta. 11. SKELETAL: No aggressive lesions. 12. LUNG BASES: No nodule or pleural effusion. IMPRESSION: Increased size of left upper pole renal mass, concerning for renal cell carcinoma. (Nephrometry score: 8ph). Hepatic steatosis. Pancreatic cysts measuring up to 0.5 cm. No suspicious solid mass, ductal dilation, or peripancreatic abnormality. According to the  [facility] Consensus Guidelines for Pancreatic Cysts, for patients without risk factors or history of pancreatic disease, consultation with a Gastroenterologist OR repeat MRI with testing for Ca19-9 and Hemoglobin A1C is recommended in 12 months. Patients with personal history or risk factors for pancreatic disease are recommended to be evaluated by a Multidisciplinary Pancreas Clinic.  MRI 24: Size: 5.0 x 3.8 x 5.3 [transverse x AP x CC] cm. Volume: 52.6 cc, Central gland: Moderate BPH. No MRI targetable lesion. 1.2 cm of protrusion of central gland BPH nodule into base of urinary bladder. Peripheral zone: No MRI targetable lesion. Patchy mild T2 hypointensities bilateral peripheral zone without restricted diffusion but with some enhancement post gadolinium which is suspicious for inflammation/prostatitis. MRI Targets: None Neurovascular bundle: No evidence of neurovascular bundle invasion. Seminal vesicles: No seminal vesicle invasion. Lymph nodes: No pelvic adenopathy. Bones: No suspicious lesions identified. Urinary bladder: Mildly trabeculated. Mild circumferential bladder urothelial enhancement. Other: Scattered colonic diverticula. Normal appendix.  IMPRESSION:  PIRADS 2 - Low (clinically significant cancer is unlikely to be present) BPH.

## 2024-09-17 ENCOUNTER — APPOINTMENT (OUTPATIENT)
Dept: UROLOGY | Facility: CLINIC | Age: 54
End: 2024-09-17

## 2024-09-17 NOTE — ASSESSMENT
[FreeTextEntry1] : 54 year old with multipole comorbiditiers and 3 prior strokes on AC with elevated PSA, LUTS/BPH, renal lesion, and recent episode heamturia.  Hematuria: We discussed possible etiologies of hematuria including benign (nephrolithiasis, BPH, medical renal disease) and malignant (urothelial cell carcinoma or kidneys, bladder, ureter, renal mass). We discussed risk factors and risk stratification of hematuria. No etiology on CTU - + enlarged prostate, mild bladder wall thickening. Cytology negative. Discussed importance of cystoscopy - patient would like to bring his father and do under nitrous - understands necessary and will schedule in coming weeks.  LUTS: primary issue at this time nocturia, pleased with daytime - SANTANA testing - patient reports this is scheduled in coming weeks - avoidance fluids before bed - will reeval after SANTANA testing and CPAP initiation  Elevated PSA: Today we discussed the natural history of prostate cancer, and the heterogeneous biology of this malignancy. He understands that many men with prostate cancer will die with the disease rather than of it. We discussed the fact that many prostate cancers are slow growing and unlikely to metastasize or cause death, whereas others can be life threatening. We discussed that PSA in and of itself does not diagnose prostate cancer but only assesses risk. Prior PSAs and MRIs reviewed - prior PSA baseline (lower now and patient as patient on Finasteride but rechecked at 3 month seema so difficult to determine exact correction) and 2 prior MRIs without lesions. PSAD <0.15 even with correction (and was on Finasteride for 3 months at that time - if use pre Finasteride value PSAD -= 0.12). Had conversation re further work-up with 1) biopsy 2) select MDX or 3) observation. SDM - in light of stability and reassuring MRI will monitor for now, especially in light of medical comorbidities and while working up concomitant issues. PSA 6 months.  Renal lesion: We again reviewed the possible underlying histology of solid enhancing renal masses, with the majority being malignant (~80%) whereas ~20% are benign (e.g. oncocytoma). We discussed the role/possibility of percutaneous biopsy, with the associated risks, benefits, complications, and accuracy issues (e.g. risk of false negative results). The heterogeneous natural history/biology of renal cell carcinoma was discussed, including the fact that while many renal cancers are indolent, others behave aggressively. Options were reviewed including, not limited to, active surveillance (AS), surgical extirpation and ablation. The risks of tumor growth and metastasis on active surveillance were reviewed, including the fact that metastatic progression on AS could mean missing the opportunity for cure. The average growth rate of ~2-3 mm/year and metastasis rates of ~2-3% on AS over 5 year interval for small renal masses <4 cm was discussed. With respect to treatment we reviewed ablation (cryosurgery, radiofrequency ablation), risks of recurrence, opportunities for salvage treatment, and imaging requirements for follow up. Oncologic outcomes for ablation were reviewed. With respect to surgery we reviewed nephron sparing surgery vs. radical nephrectomy, as well as open vs. minimally invasive surgical (MIS) approaches (e.g. laparoscopy and robotic assisted laparoscopic surgery). Personal and institutional experience, as well as other published literature was reviewed. Oncologic outcomes, renal functional outcomes were compared and contrasted between radical vs. NSS and open vs. MIS surgery. Risks of acute kidney injury, medical/surgical chronic kidney disease (either exacerbation or new-onset), as well as risks of ESRD development were reviewed. Risks of conversion from MIS to open surgery discussed. Risks of converting from attempted partial nephrectomy to radical nephrectomy were discussed. Risks of surgical complications were reviewed, including not limited to: bleeding//life-threatening hemorrhage, vascular/bowel/adjacent visceral organ injury, trocar/access injury, the possibility of recognized vs. unrecognized/delayed-recognition injury, risks of thermal/blunt/sharp/retraction injury, risks of DVT, PE, MI, death, risks of cardiopulmonary/anesthesia related complications, positional injury, infection/collection/abscess, wound complications/dehiscence/seroma/cellulitis, urinoma/fistula, ureteral injury/obstruction, as well as other complications.  CTU showed 2.3cm and reviewed with radiology has been stable since 2023. Patient would like to continue to monitor at this time given stability and comorbidities. Repeat imaging 6 months.  ED: will discuss with cards if Cialis ok - patient reports has worked for him in the past. The most common adverse reactions (>2%) of Cialis include headache, dyspepsia, back pain, myalgia, nasal congestion, flushing and pain in limb. There is also a risk of prolonged erection, particularly those at increased risk for priapism. He was informed that this medication could potentiate the effects of high blood pressure medications. He should stop the medication and contact medical help if any issues or concerns with treatment. He denies prior history of cardiac disease, chest pain, or use of nitrates. Understands cannot use nitrates if develops chest pain and must inform EMT/medical team. Has appt next week.  Plan: - cystoscopy - monitoring PSA for now - repeat 8/2024 - reached out to cardiologist re Cialis - will ensure no contraindication from cardiac perspective given comorbidities n--> response was wanted to reassess first in person - SANTANA eval underway - will reeval LUTS after - continue Flomax and Finasteride for now - fu for cysto with nitrous. - CT 9/2024

## 2024-09-17 NOTE — LETTER BODY
[FreeTextEntry3] : Delicia Doss MD Director of Robotic Education [facility]   [email or web address] [phone]

## 2024-09-17 NOTE — HISTORY OF PRESENT ILLNESS
[FreeTextEntry1] : Name FLACA MCKEE MRN [id number]  1970 Contact Number: [phone] ----------------------------------------------------------------------------------------------------------------------------------------- Date of First Visit: 3/12/24 Referring Provider/PCP: Dr. Td Skelton f: [phone], Dr. Kevin Blanton (cardiologist) -----------------------------------------------------------------------------------------------------------------------------------------  CC: elevated PSA, LUTS, renal lesion, recent hematuria  History of Present Illness: FLACA MCKEE is a 54 year old male who presents for elevated PSA and LUTS. Patient of Dr. Hunter. On Flomax and Finasteride since end of October. PSA at that time was 6.7 with questionable positive culture, treated and repeat PSA 2023 4.05 (on 3 months of Finasteride). On prior TRUS prostate 67g with pvr 18ml. Subsequently underwent prostate MRI 24: Size: 5.0 x 3.8 x 5.3 [transverse x AP x CC] cm. Volume: 52.6 cc, Central gland: Moderate BPH. No MRI targetable lesion. 1.2 cm of protrusion of central gland BPH nodule into base of urinary bladder. Peripheral zone: No MRI targetable lesion. Patchy mild T2 hypointensities bilateral peripheral zone without restricted diffusion but with some enhancement post gadolinium which is suspicious for inflammation/prostatitis. MRI Targets: None  Patient then had a stroke in November - has had 3 strokes in the past. On AC.  LUTS: Patient reports has had issues with urination since 2018 . No significant issues with urination in the daytime. No daytime frequency or urgency. Does report nocturia 3-5x - that is most bothersome. Sleep apnea suspected and patient has a sleep study in April. Stream is good overall. Unsure if emptying bladder completely. Continues on Flomax and Fiansteride and feels has been helping urination. Daytime issues are not bothersome - it is primarily a nighttime issue.  Hematuria: patient reports had an episode of hematuria - last week. A few drops. Left a urine sample, no other changes, and cleared up on its own. Culture was negative. Had this a few years ago and not worked up. Reports ex-smoker - quit over 20 years ago, smoked a pack a week for a few years at most. No history nephrolithiasis. No history symptomatic UTIs. No occupational exposures.  Elevated PSA: History of elevated PSA - see trend below. Had prior MRI - PIRADS 1. Repeat MRI here - PIRADS 2, 52.6cc, PSAD 0.07 (though has been on Finasteride 3 months).  Renal lesion: Patient had renal lesion seen on CT 2023. 2.6 x 2.4 x 1.9 cm. Plan was for observation, had repeat CT 2023: Increase size of heterogeneously enhancing left upper pole mass, now measures 3.3 x 3 x 1.8 cm.  ED: previously tried daily Cialis and reports worked well. Dr. Hunter tried Sildenafil and did not feel had effect. Interested in Cialis prn.  IPSS 12 QOL 5 ROSARIO 12 PVR (to ensure adequate emptying): 0  PSA History: 21: 7.3 21: 6.17 3/28/22: 5.29 10/6/22: 7.76 23: 5.88 10/23/23: 6.7 24: 4.05 ---------------------------------------------------------------------------------------------------------------------------------------- Interval History (2024):  3/12/24 urine studies: UA: trace LE nitrite neg +protein, 4 WBC 2 RBC UCx no growth Cytology: negative for HG  CTU 3/14/24: FINDINGS: LOWER CHEST: Within normal limits. LIVER: Multiple small, centimeter or less, nonenhancing low-attenuation foci, likely small cysts. BILE DUCTS: Normal caliber. GALLBLADDER: Within normal limits. SPLEEN: Within normal limits. PANCREAS: Within normal limits. ADRENALS: Within normal limits. KIDNEYS/URETERS: Right kidney: Multiple, varied sized, subcentimeter low-attenuation lesions noted on the venous phase images (series 4) without definite enhancing lesion. No right renal collecting system obstruction. Left kidney: An enhancing, 2.3 cm left interpole mass is noted (4:40; 5:37). 2.1 cm lower pole simple cyst left kidney. 1.5 cm left upper pole simple cyst. Scattered other subcentimeter indeterminate low-attenuation foci. No left renal collecting system obstruction. BLADDER: Mild bladder wall thickening REPRODUCTIVE ORGANS: Enlarged prostate gland BOWEL: No bowel obstruction. PERITONEUM: No ascites. VESSELS: Scattered atherosclerotic calcifications RETROPERITONEUM/LYMPH NODES: No lymphadenopathy. ABDOMINAL WALL: Within normal limits. BONES: No suspicious osseous lesions  IMPRESSION: 1. 2.3 cm enhancing left interpole renal mass, suspicious for neoplasm. 2. Scattered other bilateral indeterminate subcentimeter low-attenuation renal cortical lesions.  Recent and prior imaging reviewed with Dr. Roa: stable in all planes between 5/15/2023 and 3/20/2024; 2.3 cm in all planes since 2023, the reported changes in size is due to interobserver variation.  Plan was for cystoscopy today, but patient reports he is too nervous to proceed. Reports another episode of hematuria without any change in other symptoms, urination otherwise at baseline. ---------------------------------------------------------------------------------------------------------------------------------------- Interval History (2024):  SANTANA testing:  Cards:  Cysto:   ---------------------------------------------------------------------------------------------------------------------------------------- ---------------------------------------------------------------------------------------------------------------------------------------- PMH: afib, CAD, stroke x 3 (, , and 2023)- no residual deficits, HTN PSH: loop recorder Family History: no  malignancies - no prostate or kidney or bladder cancer, no breast or ovarian cancer Social: , 6 children, on disability, ex-smoker (quit over 20 years ago, smoked a pack a week for a few years at most), +alcohol 6 pack daily + whiskey intermittently, rare marijuana Meds: nifedipine, eliquis, entreso, hydralazine, labetalol, spironolactone, thiamine, finasteride, atorvastatin, tamsulosin Allergies: NKDA, eggplant, strawberries, broccoli ROS: no fevers, chills, dysuria ----------------------------------------------------------------------------------------------------------------------------------------- Labs: PSA History: 21: 7.3 21: 6.17 3/28/22: 5.29 10/6/22: 7.76 23: 5.88 10/23/23: 6.7 24: 4.05  Imagin23: MRI RENAL WITH AND WITHOUT IV CONTRAST  FINDINGS: 01. LIVER: Normal morphology. Hepatic steatosis. No suspicious lesion. Scattered cysts. 02. SPLEEN: Normal. 03. PANCREAS: Pancreatic cysts measuring up to 0.5 cm (series 3 image 17). No suspicious solid mass, ductal dilation, or peripancreatic abnormality. 04. GALLBLADDER/BILIARY TREE: No biliary duct dilatation. Normal gallbladder. 05. ADRENALS: Normal. 06. KIDNEYS: Symmetric enhancement. No hydronephrosis. Scattered bilateral simple and proteinaceous/hemorrhagic cysts. Increase size of heterogeneously enhancing left upper pole mass, now measures 3.3 x 3 x 1.8 cm (series 7 image 40, series 3 image 30), previously 2.6 x 2.4 x 1.9 cm when remeasured similarly.  Nephrometry Score: Radius (maximal diameter in cm): < or = 4 cm (1 point) Exophytic/Endophytic properties: <50% (2 points) Nearness of the tumor to the collecting system (mm): < or = 4 mm (3 points) Anterior/Posterior: Posterior (p) Location relative to polar lines: Lesion crosses the polar line (2 points) Tumor touches renal artery or vein yes = h Nephrometry score: 8ph 07. LYMPHADENOPATHY/RETROPERITONEUM: No lymphadenopathy. 08. BOWEL: No bowel obstruction. 09. PERITONEUM/ABDOMINAL WALL: No ascites. 10. VASCULATURE: Normal caliber abdominal aorta. 11. SKELETAL: No aggressive lesions. 12. LUNG BASES: No nodule or pleural effusion. IMPRESSION: Increased size of left upper pole renal mass, concerning for renal cell carcinoma. (Nephrometry score: 8ph). Hepatic steatosis. Pancreatic cysts measuring up to 0.5 cm. No suspicious solid mass, ductal dilation, or peripancreatic abnormality. According to the  [facility] Consensus Guidelines for Pancreatic Cysts, for patients without risk factors or history of pancreatic disease, consultation with a Gastroenterologist OR repeat MRI with testing for Ca19-9 and Hemoglobin A1C is recommended in 12 months. Patients with personal history or risk factors for pancreatic disease are recommended to be evaluated by a Multidisciplinary Pancreas Clinic.  MRI 24: Size: 5.0 x 3.8 x 5.3 [transverse x AP x CC] cm. Volume: 52.6 cc, Central gland: Moderate BPH. No MRI targetable lesion. 1.2 cm of protrusion of central gland BPH nodule into base of urinary bladder. Peripheral zone: No MRI targetable lesion. Patchy mild T2 hypointensities bilateral peripheral zone without restricted diffusion but with some enhancement post gadolinium which is suspicious for inflammation/prostatitis. MRI Targets: None Neurovascular bundle: No evidence of neurovascular bundle invasion. Seminal vesicles: No seminal vesicle invasion. Lymph nodes: No pelvic adenopathy. Bones: No suspicious lesions identified. Urinary bladder: Mildly trabeculated. Mild circumferential bladder urothelial enhancement. Other: Scattered colonic diverticula. Normal appendix.  IMPRESSION:  PIRADS 2 - Low (clinically significant cancer is unlikely to be present) BPH.

## 2024-10-22 ENCOUNTER — APPOINTMENT (OUTPATIENT)
Dept: UROLOGY | Facility: CLINIC | Age: 54
End: 2024-10-22

## 2024-11-01 ENCOUNTER — NON-APPOINTMENT (OUTPATIENT)
Age: 54
End: 2024-11-01

## 2024-12-20 ENCOUNTER — APPOINTMENT (OUTPATIENT)
Dept: UROLOGY | Facility: CLINIC | Age: 54
End: 2024-12-20
Payer: MEDICAID

## 2024-12-20 DIAGNOSIS — N40.1 BENIGN PROSTATIC HYPERPLASIA WITH LOWER URINARY TRACT SYMPMS: ICD-10-CM

## 2024-12-20 DIAGNOSIS — N13.8 BENIGN PROSTATIC HYPERPLASIA WITH LOWER URINARY TRACT SYMPMS: ICD-10-CM

## 2024-12-20 DIAGNOSIS — N28.89 OTHER SPECIFIED DISORDERS OF KIDNEY AND URETER: ICD-10-CM

## 2024-12-20 PROCEDURE — 51798 US URINE CAPACITY MEASURE: CPT

## 2024-12-20 PROCEDURE — 99215 OFFICE O/P EST HI 40 MIN: CPT | Mod: 25

## 2024-12-20 PROCEDURE — 52000 CYSTOURETHROSCOPY: CPT

## 2024-12-20 RX ORDER — FINASTERIDE 5 MG/1
5 TABLET, FILM COATED ORAL DAILY
Qty: 60 | Refills: 3 | Status: ACTIVE | COMMUNITY
Start: 2024-12-20 | End: 1900-01-01

## 2024-12-26 DIAGNOSIS — N52.01 ERECTILE DYSFUNCTION DUE TO ARTERIAL INSUFFICIENCY: ICD-10-CM

## 2024-12-26 RX ORDER — TADALAFIL 5 MG/1
5 TABLET ORAL
Qty: 60 | Refills: 2 | Status: ACTIVE | COMMUNITY
Start: 2024-12-26 | End: 1900-01-01

## 2024-12-28 RX ORDER — TADALAFIL 5 MG/1
5 TABLET ORAL
Qty: 30 | Refills: 2 | Status: ACTIVE | COMMUNITY
Start: 2024-12-24 | End: 1900-01-01

## 2025-01-02 ENCOUNTER — NON-APPOINTMENT (OUTPATIENT)
Age: 55
End: 2025-01-02

## 2025-01-09 ENCOUNTER — TRANSCRIPTION ENCOUNTER (OUTPATIENT)
Age: 55
End: 2025-01-09

## 2025-01-23 ENCOUNTER — OUTPATIENT (OUTPATIENT)
Dept: OUTPATIENT SERVICES | Facility: HOSPITAL | Age: 55
LOS: 1 days | End: 2025-01-23

## 2025-01-23 ENCOUNTER — APPOINTMENT (OUTPATIENT)
Dept: CT IMAGING | Facility: CLINIC | Age: 55
End: 2025-01-23
Payer: MEDICAID

## 2025-01-23 PROCEDURE — 74178 CT ABD&PLV WO CNTR FLWD CNTR: CPT | Mod: 26

## 2025-01-31 ENCOUNTER — APPOINTMENT (OUTPATIENT)
Dept: UROLOGY | Facility: CLINIC | Age: 55
End: 2025-01-31
Payer: MEDICAID

## 2025-01-31 PROCEDURE — 99214 OFFICE O/P EST MOD 30 MIN: CPT | Mod: 93

## 2025-01-31 PROCEDURE — G2211 COMPLEX E/M VISIT ADD ON: CPT | Mod: NC,95

## 2025-02-11 ENCOUNTER — TRANSCRIPTION ENCOUNTER (OUTPATIENT)
Age: 55
End: 2025-02-11

## 2025-02-18 ENCOUNTER — NON-APPOINTMENT (OUTPATIENT)
Age: 55
End: 2025-02-18

## 2025-03-06 ENCOUNTER — TRANSCRIPTION ENCOUNTER (OUTPATIENT)
Age: 55
End: 2025-03-06

## 2025-03-17 ENCOUNTER — TRANSCRIPTION ENCOUNTER (OUTPATIENT)
Age: 55
End: 2025-03-17

## 2025-03-20 ENCOUNTER — TRANSCRIPTION ENCOUNTER (OUTPATIENT)
Age: 55
End: 2025-03-20

## 2025-03-28 ENCOUNTER — APPOINTMENT (OUTPATIENT)
Dept: UROLOGY | Facility: CLINIC | Age: 55
End: 2025-03-28
Payer: MEDICARE

## 2025-03-28 DIAGNOSIS — N28.89 OTHER SPECIFIED DISORDERS OF KIDNEY AND URETER: ICD-10-CM

## 2025-03-28 PROCEDURE — G2211 COMPLEX E/M VISIT ADD ON: CPT | Mod: 2W

## 2025-03-28 PROCEDURE — 99203 OFFICE O/P NEW LOW 30 MIN: CPT | Mod: 93

## 2025-04-21 ENCOUNTER — APPOINTMENT (OUTPATIENT)
Dept: CT IMAGING | Facility: CLINIC | Age: 55
End: 2025-04-21

## 2025-05-07 ENCOUNTER — APPOINTMENT (OUTPATIENT)
Dept: UROLOGY | Facility: CLINIC | Age: 55
End: 2025-05-07

## 2025-05-08 ENCOUNTER — NON-APPOINTMENT (OUTPATIENT)
Age: 55
End: 2025-05-08

## 2025-05-15 ENCOUNTER — TRANSCRIPTION ENCOUNTER (OUTPATIENT)
Age: 55
End: 2025-05-15

## 2025-05-15 ENCOUNTER — NON-APPOINTMENT (OUTPATIENT)
Age: 55
End: 2025-05-15

## 2025-06-04 ENCOUNTER — RX RENEWAL (OUTPATIENT)
Age: 55
End: 2025-06-04

## 2025-08-06 ENCOUNTER — NON-APPOINTMENT (OUTPATIENT)
Age: 55
End: 2025-08-06

## 2025-08-16 ENCOUNTER — NON-APPOINTMENT (OUTPATIENT)
Age: 55
End: 2025-08-16

## 2025-08-18 ENCOUNTER — APPOINTMENT (OUTPATIENT)
Dept: UROLOGY | Facility: CLINIC | Age: 55
End: 2025-08-18

## 2025-08-28 ENCOUNTER — TRANSCRIPTION ENCOUNTER (OUTPATIENT)
Age: 55
End: 2025-08-28